# Patient Record
Sex: MALE | Race: WHITE | NOT HISPANIC OR LATINO | URBAN - METROPOLITAN AREA
[De-identification: names, ages, dates, MRNs, and addresses within clinical notes are randomized per-mention and may not be internally consistent; named-entity substitution may affect disease eponyms.]

---

## 2017-05-10 ENCOUNTER — APPOINTMENT (RX ONLY)
Dept: URBAN - METROPOLITAN AREA CLINIC 344 | Facility: CLINIC | Age: 42
Setting detail: DERMATOLOGY
End: 2017-05-10

## 2017-05-10 DIAGNOSIS — D22 MELANOCYTIC NEVI: ICD-10-CM

## 2017-05-10 DIAGNOSIS — D485 NEOPLASM OF UNCERTAIN BEHAVIOR OF SKIN: ICD-10-CM

## 2017-05-10 DIAGNOSIS — D18.0 HEMANGIOMA: ICD-10-CM

## 2017-05-10 PROBLEM — D22.5 MELANOCYTIC NEVI OF TRUNK: Status: ACTIVE | Noted: 2017-05-10

## 2017-05-10 PROBLEM — D48.5 NEOPLASM OF UNCERTAIN BEHAVIOR OF SKIN: Status: ACTIVE | Noted: 2017-05-10

## 2017-05-10 PROBLEM — D18.01 HEMANGIOMA OF SKIN AND SUBCUTANEOUS TISSUE: Status: ACTIVE | Noted: 2017-05-10

## 2017-05-10 PROCEDURE — ? PRESCRIPTION

## 2017-05-10 PROCEDURE — 11101: CPT

## 2017-05-10 PROCEDURE — ? ORDER TESTS

## 2017-05-10 PROCEDURE — ? COUNSELING

## 2017-05-10 PROCEDURE — 99203 OFFICE O/P NEW LOW 30 MIN: CPT | Mod: 25

## 2017-05-10 PROCEDURE — 11100: CPT

## 2017-05-10 PROCEDURE — ? TREATMENT REGIMEN

## 2017-05-10 PROCEDURE — ? BIOPSY BY SHAVE METHOD

## 2017-05-10 RX ORDER — DESOXIMETASONE 2.5 MG/ML
SPRAY TOPICAL
Qty: 1 | Refills: 3 | Status: ERX | COMMUNITY
Start: 2017-05-10

## 2017-05-10 RX ADMIN — DESOXIMETASONE: 2.5 SPRAY TOPICAL at 13:27

## 2017-05-10 ASSESSMENT — LOCATION ZONE DERM
LOCATION ZONE: NECK
LOCATION ZONE: LEG
LOCATION ZONE: TRUNK

## 2017-05-10 ASSESSMENT — LOCATION DETAILED DESCRIPTION DERM
LOCATION DETAILED: LEFT ANTERIOR MEDIAL DISTAL THIGH
LOCATION DETAILED: RIGHT SUPERIOR MEDIAL UPPER BACK
LOCATION DETAILED: RIGHT CLAVICULAR NECK
LOCATION DETAILED: SUPERIOR THORACIC SPINE

## 2017-05-10 ASSESSMENT — LOCATION SIMPLE DESCRIPTION DERM
LOCATION SIMPLE: LEFT THIGH
LOCATION SIMPLE: RIGHT UPPER BACK
LOCATION SIMPLE: RIGHT ANTERIOR NECK
LOCATION SIMPLE: UPPER BACK

## 2017-05-10 NOTE — PROCEDURE: BIOPSY BY SHAVE METHOD
Consent: Verbal consent was obtained and risks were reviewed including but not limited to scarring, infection, bleeding, scabbing, incomplete removal, nerve damage and allergy to anesthesia.
Cryotherapy Text: The wound bed was treated with cryotherapy after the biopsy was performed.
Wound Care: Bacitracin
Bill For Surgical Tray: no
Additional Anesthesia Volume In Cc (Will Not Render If 0): 0
Silver Nitrate Text: The wound bed was treated with silver nitrate after the biopsy was performed.
Anesthesia Volume In Cc (Will Not Render If 0): 0.5
Post-Care Instructions: I reviewed with the patient in detail post-care instructions. Patient is to keep the biopsy site dry overnight, and then apply bacitracin once daily until healed. Patient may apply hydrogen peroxide soaks to remove any crusting.
Biopsy Method: Dermablade
Notification Instructions: Patient will be notified of biopsy results. However, patient instructed to call the office if not contacted within 2 weeks.
Curettage Text: The wound bed was treated with curettage after the biopsy was performed.
Hemostasis: Electrocautery
Anesthesia Type: 1% lidocaine with 1:100,000 epinephrine
Billing Type: Third-Party Bill
Type Of Destruction Used: Curettage
Biopsy Type: H and E
Electrodesiccation And Curettage Text: The wound bed was treated with electrodesiccation and curettage after the biopsy was performed.
Electrodesiccation Text: The wound bed was treated with electrodesiccation after the biopsy was performed.
Dressing: bandage
Detail Level: Detailed

## 2017-05-10 NOTE — HPI: RASH
How Severe Is Your Rash?: moderate
Is This A New Presentation, Or A Follow-Up?: Rash
Additional History: \\nTaking OTC Claritin for eyes, helped with the swelling. \\n\\nPt saw a provider at urgent care for a yeast infection on hits buttocks, several of weeks ago and was treated with an antifungal and steroid. He states rash on buttocks cleared up but is coming back.

## 2017-05-10 NOTE — PROCEDURE: TREATMENT REGIMEN
Initiate Treatment: Cordran (samples) apply to eyelids twice a day for 1 week\\nTopicort spray- apply to affected areas on the chest and legs twice a day for 1-2 weeks
Detail Level: Detailed

## 2017-05-17 ENCOUNTER — APPOINTMENT (RX ONLY)
Dept: URBAN - METROPOLITAN AREA CLINIC 344 | Facility: CLINIC | Age: 42
Setting detail: DERMATOLOGY
End: 2017-05-17

## 2017-05-17 DIAGNOSIS — L41.4 LARGE PLAQUE PARAPSORIASIS: ICD-10-CM | Status: STABLE

## 2017-05-17 PROCEDURE — ? OTHER

## 2017-05-17 PROCEDURE — ? ADDITIONAL NOTES

## 2017-05-17 PROCEDURE — 99213 OFFICE O/P EST LOW 20 MIN: CPT

## 2017-05-17 PROCEDURE — ? COUNSELING

## 2017-05-17 PROCEDURE — ? TREATMENT REGIMEN

## 2017-05-17 ASSESSMENT — LOCATION DETAILED DESCRIPTION DERM
LOCATION DETAILED: RIGHT CLAVICULAR NECK
LOCATION DETAILED: LEFT ANTERIOR MEDIAL DISTAL THIGH

## 2017-05-17 ASSESSMENT — LOCATION ZONE DERM
LOCATION ZONE: LEG
LOCATION ZONE: NECK

## 2017-05-17 ASSESSMENT — LOCATION SIMPLE DESCRIPTION DERM
LOCATION SIMPLE: RIGHT ANTERIOR NECK
LOCATION SIMPLE: LEFT THIGH

## 2017-05-17 NOTE — PROCEDURE: OTHER
Other (Free Text): Bx being reviewed by [place] (Dr. Cardoza).  Atypical lymphocytes are present.  d/w pt prelim path.  Bx sites with fibrinous debri and mild surrounding erythema.  LabsWNL Other (Free Text): Bx being reviewed by [place] (Dr. Cardoza).  Atypical lymphocytes are present.  d/w pt prelim path.  Bx sites with fibrinous debri and mild surrounding erythema.  LabsWNL

## 2017-05-17 NOTE — PROCEDURE: TREATMENT REGIMEN
Detail Level: Detailed
Continue Regimen: Topicort spray: apply to affected areas of body twice a day for two weeks then once a week until f/u\\nCordran cream: apply to affected area of eyes once a day for two weeks then d/c

## 2017-05-17 NOTE — PROCEDURE: ADDITIONAL NOTES
Additional Notes: Preliminary biopsy results were gone over in detail with the patient. Reassurance was given. Photos taken today. SESAR

## 2017-05-30 ENCOUNTER — APPOINTMENT (RX ONLY)
Dept: URBAN - METROPOLITAN AREA CLINIC 348 | Facility: CLINIC | Age: 42
Setting detail: DERMATOLOGY
End: 2017-05-30

## 2017-05-30 DIAGNOSIS — C84.0 MYCOSIS FUNGOIDES: ICD-10-CM

## 2017-05-30 DIAGNOSIS — L41.4 LARGE PLAQUE PARAPSORIASIS: ICD-10-CM

## 2017-05-30 PROCEDURE — ? TREATMENT REGIMEN

## 2017-06-28 ENCOUNTER — APPOINTMENT (RX ONLY)
Dept: URBAN - METROPOLITAN AREA CLINIC 344 | Facility: CLINIC | Age: 42
Setting detail: DERMATOLOGY
End: 2017-06-28

## 2017-06-28 DIAGNOSIS — L70.8 OTHER ACNE: ICD-10-CM

## 2017-06-28 DIAGNOSIS — L41.4 LARGE PLAQUE PARAPSORIASIS: ICD-10-CM | Status: STABLE

## 2017-06-28 PROCEDURE — 99213 OFFICE O/P EST LOW 20 MIN: CPT

## 2017-06-28 PROCEDURE — ? TREATMENT REGIMEN

## 2017-06-28 PROCEDURE — ? COUNSELING

## 2017-06-28 PROCEDURE — ? OTHER

## 2017-06-28 ASSESSMENT — LOCATION SIMPLE DESCRIPTION DERM
LOCATION SIMPLE: LEFT THIGH
LOCATION SIMPLE: RIGHT THIGH
LOCATION SIMPLE: RIGHT ANTERIOR NECK

## 2017-06-28 ASSESSMENT — LOCATION DETAILED DESCRIPTION DERM
LOCATION DETAILED: LEFT ANTERIOR MEDIAL DISTAL THIGH
LOCATION DETAILED: RIGHT CLAVICULAR NECK
LOCATION DETAILED: RIGHT ANTERIOR DISTAL THIGH
LOCATION DETAILED: LEFT ANTERIOR PROXIMAL THIGH

## 2017-06-28 ASSESSMENT — LOCATION ZONE DERM
LOCATION ZONE: NECK
LOCATION ZONE: LEG

## 2017-06-28 NOTE — PROCEDURE: OTHER
Other (Free Text): May consider patch testing and longer taper for eyelids/ redness is present upon exam today.  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
Detail Level: Detailed
Note Text (......Xxx Chief Complaint.): This diagnosis correlates with the

## 2017-06-28 NOTE — PROCEDURE: TREATMENT REGIMEN
Samples Given: Panoxyl wash daily to affected areas
Detail Level: Detailed
Continue Regimen: Cordran cream for two weeks twice a day to eyelid area

## 2017-12-20 ENCOUNTER — APPOINTMENT (RX ONLY)
Dept: URBAN - METROPOLITAN AREA CLINIC 344 | Facility: CLINIC | Age: 42
Setting detail: DERMATOLOGY
End: 2017-12-20

## 2017-12-20 DIAGNOSIS — B35.1 TINEA UNGUIUM: ICD-10-CM

## 2017-12-20 DIAGNOSIS — H01.13 ECZEMATOUS DERMATITIS OF EYELID: ICD-10-CM | Status: INADEQUATELY CONTROLLED

## 2017-12-20 DIAGNOSIS — L41.4 LARGE PLAQUE PARAPSORIASIS: ICD-10-CM | Status: WELL CONTROLLED

## 2017-12-20 PROBLEM — H01.134 ECZEMATOUS DERMATITIS OF LEFT UPPER EYELID: Status: ACTIVE | Noted: 2017-12-20

## 2017-12-20 PROBLEM — J45.909 UNSPECIFIED ASTHMA, UNCOMPLICATED: Status: ACTIVE | Noted: 2017-12-20

## 2017-12-20 PROBLEM — H01.131 ECZEMATOUS DERMATITIS OF RIGHT UPPER EYELID: Status: ACTIVE | Noted: 2017-12-20

## 2017-12-20 PROCEDURE — ? COUNSELING

## 2017-12-20 PROCEDURE — 99214 OFFICE O/P EST MOD 30 MIN: CPT

## 2017-12-20 PROCEDURE — ? TREATMENT REGIMEN

## 2017-12-20 PROCEDURE — ? PRESCRIPTION

## 2017-12-20 RX ORDER — EFINACONAZOLE 100 MG/ML
SOLUTION TOPICAL
Qty: 1 | Refills: 8 | Status: ERX | COMMUNITY
Start: 2017-12-20

## 2017-12-20 RX ORDER — FLURANDRENOLIDE 0.5 MG/ML
LOTION TOPICAL
Qty: 1 | Refills: 3 | Status: ERX | COMMUNITY
Start: 2017-12-20

## 2017-12-20 RX ORDER — EMOLLIENT COMBINATION NO.43
CREAM (GRAM) TOPICAL
Qty: 1 | Refills: 3 | Status: ERX | COMMUNITY
Start: 2017-12-20

## 2017-12-20 RX ADMIN — EFINACONAZOLE: 100 SOLUTION TOPICAL at 15:04

## 2017-12-20 RX ADMIN — Medication: at 15:01

## 2017-12-20 RX ADMIN — FLURANDRENOLIDE: 0.5 LOTION TOPICAL at 15:01

## 2017-12-20 ASSESSMENT — LOCATION ZONE DERM
LOCATION ZONE: NECK
LOCATION ZONE: LEG
LOCATION ZONE: TOENAIL
LOCATION ZONE: EYELID

## 2017-12-20 ASSESSMENT — LOCATION SIMPLE DESCRIPTION DERM
LOCATION SIMPLE: RIGHT 3RD TOE
LOCATION SIMPLE: LEFT 3RD TOE
LOCATION SIMPLE: RIGHT SUPERIOR EYELID
LOCATION SIMPLE: LEFT SUPERIOR EYELID
LOCATION SIMPLE: LEFT THIGH
LOCATION SIMPLE: RIGHT ANTERIOR NECK

## 2017-12-20 ASSESSMENT — LOCATION DETAILED DESCRIPTION DERM
LOCATION DETAILED: LEFT 3RD TOENAIL
LOCATION DETAILED: RIGHT CLAVICULAR NECK
LOCATION DETAILED: RIGHT LATERAL SUPERIOR EYELID
LOCATION DETAILED: RIGHT 3RD TOENAIL
LOCATION DETAILED: LEFT ANTERIOR MEDIAL DISTAL THIGH
LOCATION DETAILED: LEFT LATERAL SUPERIOR EYELID

## 2017-12-20 ASSESSMENT — BSA RASH: BSA RASH: 0

## 2017-12-20 NOTE — PROCEDURE: TREATMENT REGIMEN
Otc Regimen: Zyrtec twice a day for one month (Claritin in the morning )
Plan: Will schedule with [facility] for patch testing in  in the first of the  year
Initiate Treatment: Cordran lotion: apply to affected areas twice a day for two weeks then once a day for two weeks\\nPromiseb: apply to affected areas twice daily behind ears and around eyes
Detail Level: Detailed